# Patient Record
Sex: FEMALE | Race: WHITE | Employment: FULL TIME | URBAN - METROPOLITAN AREA
[De-identification: names, ages, dates, MRNs, and addresses within clinical notes are randomized per-mention and may not be internally consistent; named-entity substitution may affect disease eponyms.]

---

## 2018-10-27 ENCOUNTER — HOSPITAL ENCOUNTER (OUTPATIENT)
Age: 28
Discharge: HOME | End: 2018-10-27
Payer: COMMERCIAL

## 2018-10-27 VITALS
SYSTOLIC BLOOD PRESSURE: 125 MMHG | TEMPERATURE: 98 F | RESPIRATION RATE: 16 BRPM | HEART RATE: 80 BPM | WEIGHT: 155 LBS | DIASTOLIC BLOOD PRESSURE: 90 MMHG | OXYGEN SATURATION: 98 %

## 2018-10-27 DIAGNOSIS — R22.31 NODULE OF FINGER OF RIGHT HAND: Primary | ICD-10-CM

## 2018-10-27 PROCEDURE — 99203 OFFICE O/P NEW LOW 30 MIN: CPT

## 2018-10-27 PROCEDURE — 99204 OFFICE O/P NEW MOD 45 MIN: CPT

## 2018-10-27 RX ORDER — PREDNISONE 20 MG/1
40 TABLET ORAL DAILY
Qty: 10 TABLET | Refills: 0 | Status: SHIPPED | OUTPATIENT
Start: 2018-10-27 | End: 2018-11-01

## 2018-10-27 NOTE — ED INITIAL ASSESSMENT (HPI)
Right index finger has a raised white area, no injury, hurts when you touch or bend it, for one week, no fevers.

## 2018-10-27 NOTE — ED PROVIDER NOTES
Patient Seen in: [id number] [facility] [place]    History   Patient presents with:  Finger Pain    Stated Complaint: RIGHTT INDEX FINGER PAIN/LUMP    HPI    CHIEF COMPLAINT: Bump on the right index finger     HISTORY OF PRESENT ILLNESS: Patient is a 29 Positive for stated complaint: RIGHTT INDEX FINGER PAIN/LUMP  Other systems are as noted in HPI. Constitutional and vital signs reviewed. All other systems reviewed and negative except as noted above.     Physical Exam     ED Triage Vitals [10/27/18 0 This is a well-appearing 27-year-old female with complaint of a nodule or a bump over the PIP of the right index finger for the past 1 week. Patient does seem to have a superficial nodule.   It is possible this is an insect bite that she is having some kin